# Patient Record
Sex: FEMALE | Race: WHITE
[De-identification: names, ages, dates, MRNs, and addresses within clinical notes are randomized per-mention and may not be internally consistent; named-entity substitution may affect disease eponyms.]

---

## 2022-06-20 ENCOUNTER — HOSPITAL ENCOUNTER (OUTPATIENT)
Dept: HOSPITAL 94 - RAD | Age: 77
Discharge: HOME | End: 2022-06-20
Attending: PSYCHIATRY & NEUROLOGY
Payer: MEDICARE

## 2022-06-20 DIAGNOSIS — R41.89: Primary | ICD-10-CM

## 2022-06-20 PROCEDURE — 95816 EEG AWAKE AND DROWSY: CPT

## 2023-09-13 ENCOUNTER — HOSPITAL ENCOUNTER (OUTPATIENT)
Dept: HOSPITAL 94 - VAS | Age: 78
Discharge: HOME | End: 2023-09-13
Attending: INTERNAL MEDICINE
Payer: MEDICARE

## 2023-09-13 VITALS — RESPIRATION RATE: 18 BRPM | HEART RATE: 55 BPM | OXYGEN SATURATION: 91 %

## 2023-09-13 VITALS — RESPIRATION RATE: 18 BRPM | HEART RATE: 55 BPM

## 2023-09-13 DIAGNOSIS — I51.7: ICD-10-CM

## 2023-09-13 DIAGNOSIS — M41.86: ICD-10-CM

## 2023-09-13 DIAGNOSIS — I65.23: Primary | ICD-10-CM

## 2023-09-13 DIAGNOSIS — M43.16: ICD-10-CM

## 2023-09-13 DIAGNOSIS — N32.89: ICD-10-CM

## 2023-09-13 DIAGNOSIS — K57.30: ICD-10-CM

## 2023-09-13 DIAGNOSIS — I70.0: ICD-10-CM

## 2023-09-13 LAB
ALBUMIN SERPL BCP-MCNC: 3.6 G/DL (ref 3.4–5)
ALBUMIN/GLOB SERPL: 1.4 {RATIO} (ref 1.1–1.5)
ALP SERPL-CCNC: 70 IU/L (ref 46–116)
ALT SERPL W P-5'-P-CCNC: 27 U/L (ref 12–78)
ANION GAP SERPL CALCULATED.3IONS-SCNC: 4 MMOL/L (ref 8–16)
APTT PPP: 25 SECONDS (ref 22–32)
AST SERPL W P-5'-P-CCNC: 17 U/L (ref 10–37)
BASOPHILS # BLD AUTO: 0 X10'3 (ref 0–0.2)
BASOPHILS NFR BLD AUTO: 0.4 % (ref 0–1)
BILIRUB SERPL-MCNC: 0.5 MG/DL (ref 0.1–1)
BUN SERPL-MCNC: 16 MG/DL (ref 7–18)
BUN/CREAT SERPL: 21.3 (ref 10–20)
CALCIUM SERPL-MCNC: 9.1 MG/DL (ref 8.5–10.1)
CHLORIDE SERPL-SCNC: 106 MMOL/L (ref 99–107)
CO2 SERPL-SCNC: 32.1 MMOL/L (ref 24–32)
CREAT CL PREDICTED SERPL C-G-VRATE: (no result) ML/MIN
CREAT SERPL-MCNC: 0.75 MG/DL (ref 0.4–0.9)
EOSINOPHIL # BLD AUTO: 0.1 X10'3 (ref 0–0.9)
EOSINOPHIL NFR BLD AUTO: 2.8 % (ref 0–6)
ERYTHROCYTE [DISTWIDTH] IN BLOOD BY AUTOMATED COUNT: 14.2 % (ref 11.5–14.5)
GFR SERPL CREATININE-BSD FRML MDRD: 75 ML/MIN
GLOBULIN SER CALC-MCNC: 2.6 G/DL (ref 2.7–4.3)
GLUCOSE SERPL-MCNC: 86 MG/DL (ref 70–104)
HCT VFR BLD AUTO: 40.3 % (ref 35–45)
HGB BLD-MCNC: 13.7 G/DL (ref 12–16)
INR PPP: 1 INR
LYMPHOCYTES # BLD AUTO: 1 X10'3 (ref 1.1–4.8)
LYMPHOCYTES NFR BLD AUTO: 22.5 % (ref 21–51)
MCH RBC QN AUTO: 30.7 PG (ref 27–31)
MCHC RBC AUTO-ENTMCNC: 34 G/DL (ref 33–36.5)
MCV RBC AUTO: 90.3 FL (ref 78–98)
MONOCYTES # BLD AUTO: 0.6 X10'3 (ref 0–0.9)
MONOCYTES NFR BLD AUTO: 12.1 % (ref 2–12)
NEUTROPHILS # BLD AUTO: 2.9 X10'3 (ref 1.8–7.7)
NEUTROPHILS NFR BLD AUTO: 62.2 % (ref 42–75)
NT-PROBNP SERPL-MCNC: 1830 PG/ML (ref 0–450)
PLATELET # BLD AUTO: 107 X10'3 (ref 140–440)
PMV BLD AUTO: 8.5 FL (ref 7.4–10.4)
POTASSIUM SERPL-SCNC: 4.2 MMOL/L (ref 3.5–5.1)
PROT SERPL-MCNC: 6.2 G/DL (ref 6.4–8.2)
PROTHROMBIN TIME: 11.1 SECONDS (ref 9–12)
RBC # BLD AUTO: 4.46 X10'6 (ref 4.2–5.6)
SODIUM SERPL-SCNC: 142 MMOL/L (ref 135–145)
WBC # BLD AUTO: 4.6 X10'3 (ref 4.5–11)

## 2023-09-13 PROCEDURE — 74174 CTA ABD&PLVS W/CONTRAST: CPT

## 2023-09-13 PROCEDURE — 75572 CT HRT W/3D IMAGE: CPT

## 2023-09-13 PROCEDURE — 71046 X-RAY EXAM CHEST 2 VIEWS: CPT

## 2023-09-13 PROCEDURE — 85025 COMPLETE CBC W/AUTO DIFF WBC: CPT

## 2023-09-13 PROCEDURE — 85730 THROMBOPLASTIN TIME PARTIAL: CPT

## 2023-09-13 PROCEDURE — 36415 COLL VENOUS BLD VENIPUNCTURE: CPT

## 2023-09-13 PROCEDURE — 71275 CT ANGIOGRAPHY CHEST: CPT

## 2023-09-13 PROCEDURE — 93880 EXTRACRANIAL BILAT STUDY: CPT

## 2023-09-13 PROCEDURE — 80053 COMPREHEN METABOLIC PANEL: CPT

## 2023-09-13 PROCEDURE — 85610 PROTHROMBIN TIME: CPT

## 2023-09-13 PROCEDURE — 83880 ASSAY OF NATRIURETIC PEPTIDE: CPT

## 2023-09-14 ENCOUNTER — HOSPITAL ENCOUNTER (OUTPATIENT)
Dept: HOSPITAL 94 - TAVR | Age: 78
Discharge: HOME | End: 2023-09-14
Attending: INTERNAL MEDICINE
Payer: MEDICARE

## 2023-09-14 VITALS
RESPIRATION RATE: 24 BRPM | DIASTOLIC BLOOD PRESSURE: 34 MMHG | HEART RATE: 65 BPM | SYSTOLIC BLOOD PRESSURE: 161 MMHG | OXYGEN SATURATION: 95 % | TEMPERATURE: 97.6 F

## 2023-09-14 VITALS — WEIGHT: 130.73 LBS | BODY MASS INDEX: 25.67 KG/M2 | HEIGHT: 60 IN

## 2023-09-14 DIAGNOSIS — R06.02: ICD-10-CM

## 2023-09-14 DIAGNOSIS — I10: ICD-10-CM

## 2023-09-14 DIAGNOSIS — I35.0: Primary | ICD-10-CM

## 2023-09-14 DIAGNOSIS — I65.29: ICD-10-CM

## 2023-09-14 NOTE — NUR
Patient and Daughter Lisa were in the [facility] today to consult with Dr. Noonan, Dr. AVILA Goodrich and Dr. Bacon. [facility] test completed. Vital signs measured. 
Patient education reviewed and questions answered.